# Patient Record
Sex: FEMALE | Employment: FULL TIME | URBAN - METROPOLITAN AREA
[De-identification: names, ages, dates, MRNs, and addresses within clinical notes are randomized per-mention and may not be internally consistent; named-entity substitution may affect disease eponyms.]

---

## 2017-06-30 ENCOUNTER — RADIANT APPOINTMENT (OUTPATIENT)
Dept: MAMMOGRAPHY | Facility: CLINIC | Age: 42
End: 2017-06-30
Payer: COMMERCIAL

## 2017-06-30 ENCOUNTER — OFFICE VISIT (OUTPATIENT)
Dept: OBGYN | Facility: CLINIC | Age: 42
End: 2017-06-30
Payer: COMMERCIAL

## 2017-06-30 VITALS
WEIGHT: 156 LBS | SYSTOLIC BLOOD PRESSURE: 104 MMHG | BODY MASS INDEX: 23.64 KG/M2 | HEIGHT: 68 IN | DIASTOLIC BLOOD PRESSURE: 60 MMHG

## 2017-06-30 DIAGNOSIS — D68.2 FACTOR V DEFICIENCY (H): ICD-10-CM

## 2017-06-30 DIAGNOSIS — Z12.31 VISIT FOR SCREENING MAMMOGRAM: ICD-10-CM

## 2017-06-30 DIAGNOSIS — R56.9 SEIZURE (H): ICD-10-CM

## 2017-06-30 DIAGNOSIS — Z01.419 ENCOUNTER FOR GYNECOLOGICAL EXAMINATION WITHOUT ABNORMAL FINDING: Primary | ICD-10-CM

## 2017-06-30 PROCEDURE — 99386 PREV VISIT NEW AGE 40-64: CPT | Performed by: OBSTETRICS & GYNECOLOGY

## 2017-06-30 PROCEDURE — 77063 BREAST TOMOSYNTHESIS BI: CPT | Mod: TC

## 2017-06-30 PROCEDURE — G0202 SCR MAMMO BI INCL CAD: HCPCS | Mod: TC

## 2017-06-30 RX ORDER — OMEGA-3/DHA/EPA/FISH OIL 60 MG-90MG
CAPSULE ORAL
COMMUNITY
End: 2019-12-23

## 2017-06-30 RX ORDER — LEVETIRACETAM 750 MG/1
750 TABLET ORAL
COMMUNITY
Start: 2017-03-15 | End: 2019-12-23

## 2017-06-30 RX ORDER — ERGOCALCIFEROL 1.25 MG/1
50000 CAPSULE ORAL
COMMUNITY
End: 2020-05-12

## 2017-06-30 ASSESSMENT — ANXIETY QUESTIONNAIRES
7. FEELING AFRAID AS IF SOMETHING AWFUL MIGHT HAPPEN: NOT AT ALL
IF YOU CHECKED OFF ANY PROBLEMS ON THIS QUESTIONNAIRE, HOW DIFFICULT HAVE THESE PROBLEMS MADE IT FOR YOU TO DO YOUR WORK, TAKE CARE OF THINGS AT HOME, OR GET ALONG WITH OTHER PEOPLE: NOT DIFFICULT AT ALL
5. BEING SO RESTLESS THAT IT IS HARD TO SIT STILL: NOT AT ALL
2. NOT BEING ABLE TO STOP OR CONTROL WORRYING: NOT AT ALL
3. WORRYING TOO MUCH ABOUT DIFFERENT THINGS: NOT AT ALL
GAD7 TOTAL SCORE: 2
1. FEELING NERVOUS, ANXIOUS, OR ON EDGE: SEVERAL DAYS
6. BECOMING EASILY ANNOYED OR IRRITABLE: NOT AT ALL

## 2017-06-30 ASSESSMENT — PATIENT HEALTH QUESTIONNAIRE - PHQ9: 5. POOR APPETITE OR OVEREATING: SEVERAL DAYS

## 2017-06-30 NOTE — MR AVS SNAPSHOT
"              After Visit Summary   2017    Lucia Moore    MRN: [id number]           Patient Information     Date Of Birth          1975        Visit Information        Provider Department      2017 8:00 AM Ebenezer Sterling MD [facility]        Today's Diagnoses     Encounter for gynecological examination without abnormal finding    -  1    Seizure (H)        Factor V deficiency (H)           Follow-ups after your visit        Who to contact     If you have questions or need follow up information about today's clinic visit or your schedule please contact [facility] directly at [phone].  Normal or non-critical lab and imaging results will be communicated to you by Faniumhart, letter or phone within 4 business days after the clinic has received the results. If you do not hear from us within 7 days, please contact the clinic through Faniumhart or phone. If you have a critical or abnormal lab result, we will notify you by phone as soon as possible.  Submit refill requests through Ascendant Dx or call your pharmacy and they will forward the refill request to us. Please allow 3 business days for your refill to be completed.          Additional Information About Your Visit        MyChart Information     Ascendant Dx lets you send messages to your doctor, view your test results, renew your prescriptions, schedule appointments and more. To sign up, go to [email or web address] . Click on \"Log in\" on the left side of the screen, which will take you to the Welcome page. Then click on \"Sign up Now\" on the right side of the page.     You will be asked to enter the access code listed below, as well as some personal information. Please follow the directions to create your username and password.     Your access code is: [email or web address]  Expires: 2017 12:16 PM     Your access code will  in 90 days. If you need help or a new code, please call your [place] clinic or " "[phone].        Care EveryWhere ID     This is your Care EveryWhere ID. This could be used by other organizations to access your [place] medical records  [id number]        Your Vitals Were     Height Last Period BMI (Body Mass Index)             5' 7.5\" (1.715 m) 06/02/2017 (Approximate) 24.07 kg/m2          Blood Pressure from Last 3 Encounters:   06/30/17 104/60    Weight from Last 3 Encounters:   06/30/17 156 lb (70.8 kg)              Today, you had the following     No orders found for display       Primary Care Provider Office Phone # Fax #    Aristides Mendez [phone]       [facility] [address] [place] [address]        Equal Access to Services     DELFINO DYSON : Hadii lyle ku hadasho Soomaali, waaxda luqadaha, qaybta kaalmada adeegyada, waxay idiin hayaan delfino goyal . So [facility] [phone].    ATENCIÓN: Si habla español, tiene a phillips disposición servicios gratuitos de asistencia lingüística. Llame al [phone].    We comply with applicable federal civil rights laws and [place] laws. We do not discriminate on the basis of race, color, national origin, age, disability sex, sexual orientation or gender identity.            Thank you!     Thank you for choosing [facility]  for your care. Our goal is always to provide you with excellent care. Hearing back from our patients is one way we can continue to improve our services. Please take a few minutes to complete the written survey that you may receive in the mail after your visit with us. Thank you!             Your Updated Medication List - Protect others around you: Learn how to safely use, store and throw away your medicines at www.disposemymeds.org.          This list is accurate as of: 6/30/17 12:16 PM.  Always use your most recent med list.                   Brand Name Dispense Instructions for use Diagnosis    ergocalciferol 47215 UNITS capsule    ERGOCALCIFEROL     Take " 50,000 Units by mouth        Fish Oil 500 MG Caps           levETIRAcetam 750 MG tablet    KEPPRA     Take 750 mg by mouth

## 2017-06-30 NOTE — PROGRESS NOTES
Lucia is a 41 year old No obstetric history on file. female who presents for annual exam.     Besides routine health maintenance, she has no other health concerns today .    HPI:  PCP is Dr. Logan Mendez from [facility].  Had headaches last year. PCP ordered CT. 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 Had emergency craniotomy and resection. Did have seizure and has been kept on meds.    just had VAS. Pt off hormonal OCP.  Cycles normal.   Also has Factor V mutation. No hx of clots.     Works at [employer]. President of [employer].      GYNECOLOGIC HISTORY:    Patient's last menstrual period was 06/02/2017 (approximate).  Her current contraception method is: vasectomy.  She  reports that she has never smoked. She does not have any smokeless tobacco history on file.    Patient is sexually active.  STD testing offered?  Declined  Last PHQ-9 score on record =   PHQ-9 SCORE 6/30/2017   Total Score 1     Last GAD7 score on record =   RITA-7 SCORE 6/30/2017   Total Score 2     Alcohol Score = 4    HEALTH MAINTENANCE:  Cholesterol: None found  Last Mammo: 18 months ago, Result: normal, Next Mammo: today   Pap: 02/04/16 wnl, HPV- done at [facility]; on care everywhere  Colonoscopy:  Never, Result: not applicable, Next Colonoscopy: 8 years.  Dexa:  Never    Health maintenance updated:  yes    HISTORY:  Obstetric History     No data available          Patient Active Problem List   Diagnosis     Seizure (H)     Factor V deficiency (H)     Past Surgical History:   Procedure Laterality Date     BRAIN SURGERY  05/18/2016      Social History   Substance Use Topics     Smoking status: Never Smoker     Smokeless tobacco: Not on file     Alcohol use Yes      Problem (# of Occurrences) Relation (Name,Age of Onset)    Hyperlipidemia (1) Father            Current Outpatient Prescriptions   Medication Sig     levETIRAcetam (KEPPRA) 750 MG tablet Take 750 mg by mouth     ergocalciferol (ERGOCALCIFEROL) 92620 UNITS capsule  "Take 50,000 Units by mouth     Omega-3 Fatty Acids (FISH OIL) 500 MG CAPS      No current facility-administered medications for this visit.      Allergies   Allergen Reactions     Amoxicillin-Pot Clavulanate      PN: bad yeast infection       Past medical, surgical, social and family histories were reviewed and updated in EPIC.    ROS:   12 point review of systems negative other than symptoms noted below.  Gastrointestinal: Heartburn  Neurologic: Headaches    EXAM:  /60  Ht 5' 7.5\" (1.715 m)  Wt 156 lb (70.8 kg)  LMP 06/02/2017 (Approximate)  BMI 24.07 kg/m2   BMI: Body mass index is 24.07 kg/(m^2).    PHYSICAL EXAM:  Constitutional:  Appearance: Well nourished, well developed, alert, in no acute distress  Neck:  Lymph Nodes:  No lymphadenopathy present    Thyroid:  Gland size normal, nontender, no nodules or masses present  on palpation  Chest:  Respiratory Effort:  Breathing unlabored  Cardiovascular:    Heart: Auscultation:  Regular rate, normal rhythm, no murmurs present  Breasts: Inspection of Breasts:  No lymphadenopathy present    Palpation of Breasts and Axillae:  No masses present on palpation, no  breast tenderness    Axillary Lymph Nodes:  No lymphadenopathy present  Gastrointestinal:   Abdominal Examination:  Abdomen nontender to palpation, tone normal without rigidity or guarding, no masses present, umbilicus without lesions   Liver and Spleen:  No hepatomegaly present, liver nontender to palpation    Hernias:  No hernias present  Lymphatic: Lymph Nodes:  No other lymphadenopathy present  Skin:  General Inspection:  No rashes present, no lesions present, no areas of  discoloration    Genitalia and Groin:  No rashes present, no lesions present, no areas of  discoloration, no masses present  Neurologic/Psychiatric:    Mental Status:  Oriented X3     Pelvic Exam:  External Genitalia:     Normal appearance for age, no discharge present, no tenderness present, no inflammatory lesions present, color " normal  Vagina:     Normal vaginal vault without central or paravaginal defects, no discharge present, no inflammatory lesions present, no masses present  Bladder:     Nontender to palpation  Urethra:   Urethral Body:  Urethra palpation normal, urethra structural support normal   Urethral Meatus:  No erythema or lesions present  Cervix:     Appearance healthy, no lesions present, nontender to palpation, no bleeding present  Uterus:     Uterus: firm, normal sized and nontender, anteverted in position.   Adnexa:     No adnexal tenderness present, no adnexal masses present  Perineum:     Perineum within normal limits, no evidence of trauma, no rashes or skin lesions present  Anus:     Anus within normal limits, no hemorrhoids present  Inguinal Lymph Nodes:     No lymphadenopathy present  Pubic Hair:     Normal pubic hair distribution for age  Genitalia and Groin:     No rashes present, no lesions present, no areas of discoloration, no masses present    COUNSELING:   Reviewed preventive health counseling, as reflected in patient instructions       Regular exercise    BMI: Body mass index is 24.07 kg/(m^2).      ASSESSMENT:  41 year old female with satisfactory annual exam.    ICD-10-CM    1. Encounter for gynecological examination without abnormal finding Z01.419    2. Seizure (H) R56.9    3. Factor V deficiency (H) D68.2        PLAN:  Pt has regular cycles. No GYN issues. RTC 1-2 years for exam. Last pap and HPV normal in 2016, so no pap in 4 years.      Ebenezer Sterling MD

## 2017-07-01 ASSESSMENT — ANXIETY QUESTIONNAIRES: GAD7 TOTAL SCORE: 2

## 2017-07-01 ASSESSMENT — PATIENT HEALTH QUESTIONNAIRE - PHQ9: SUM OF ALL RESPONSES TO PHQ QUESTIONS 1-9: 1

## 2017-09-15 ENCOUNTER — TELEPHONE (OUTPATIENT)
Dept: NURSING | Facility: CLINIC | Age: 42
End: 2017-09-15

## 2017-09-15 ENCOUNTER — OFFICE VISIT (OUTPATIENT)
Dept: MIDWIFE SERVICES | Facility: CLINIC | Age: 42
End: 2017-09-15
Payer: COMMERCIAL

## 2017-09-15 VITALS — BODY MASS INDEX: 24.38 KG/M2 | DIASTOLIC BLOOD PRESSURE: 74 MMHG | WEIGHT: 158 LBS | SYSTOLIC BLOOD PRESSURE: 106 MMHG

## 2017-09-15 DIAGNOSIS — N89.8 ITCHING OF VAGINA: ICD-10-CM

## 2017-09-15 DIAGNOSIS — R35.0 URINARY FREQUENCY: ICD-10-CM

## 2017-09-15 DIAGNOSIS — R30.0 DYSURIA: Primary | ICD-10-CM

## 2017-09-15 LAB
ALBUMIN UR-MCNC: NEGATIVE MG/DL
APPEARANCE UR: CLEAR
BILIRUB UR QL STRIP: NEGATIVE
COLOR UR AUTO: YELLOW
GLUCOSE UR STRIP-MCNC: NEGATIVE MG/DL
HGB UR QL STRIP: ABNORMAL
KETONES UR STRIP-MCNC: NEGATIVE MG/DL
LEUKOCYTE ESTERASE UR QL STRIP: NEGATIVE
NITRATE UR QL: NEGATIVE
NON-SQ EPI CELLS #/AREA URNS LPF: ABNORMAL /LPF
PH UR STRIP: 6 PH (ref 5–7)
RBC #/AREA URNS AUTO: ABNORMAL /HPF
SOURCE: ABNORMAL
SP GR UR STRIP: <=1.005 (ref 1–1.03)
SPECIMEN SOURCE: ABNORMAL
UROBILINOGEN UR STRIP-ACNC: 0.2 EU/DL (ref 0.2–1)
WBC #/AREA URNS AUTO: ABNORMAL /HPF
WET PREP SPEC: ABNORMAL

## 2017-09-15 PROCEDURE — 99213 OFFICE O/P EST LOW 20 MIN: CPT | Performed by: ADVANCED PRACTICE MIDWIFE

## 2017-09-15 PROCEDURE — 87210 SMEAR WET MOUNT SALINE/INK: CPT | Performed by: ADVANCED PRACTICE MIDWIFE

## 2017-09-15 PROCEDURE — 87086 URINE CULTURE/COLONY COUNT: CPT | Performed by: ADVANCED PRACTICE MIDWIFE

## 2017-09-15 PROCEDURE — 81001 URINALYSIS AUTO W/SCOPE: CPT | Performed by: ADVANCED PRACTICE MIDWIFE

## 2017-09-15 RX ORDER — FLUCONAZOLE 150 MG/1
150 TABLET ORAL ONCE
Qty: 2 TABLET | Refills: 0 | Status: SHIPPED | OUTPATIENT
Start: 2017-09-15 | End: 2017-09-15

## 2017-09-15 RX ORDER — NITROFURANTOIN 25; 75 MG/1; MG/1
100 CAPSULE ORAL 2 TIMES DAILY
Qty: 14 CAPSULE | Refills: 0 | Status: SHIPPED | OUTPATIENT
Start: 2017-09-15 | End: 2019-12-23

## 2017-09-15 NOTE — TELEPHONE ENCOUNTER
Pt states she is pretty sure she has a bladder infection and potential yeast infection. Pt states she has not had on for a while. Pt states she is having urgency, polyuria, states not much urine comes out when she does go to bathroom. Pt denies dysuria, pain, blood in urine.   Per protocol for yeast symptoms:    If sx are primarily itching, no significant discharge: yes    Try Monistat (or generis equivalent) 3 or 7 days OTC (not 1 day). If not better in 3 days, needs to be seen.  Informed pt for her bladder concerns needs to be seen. Pt scheduled with CNM today at earliest time she is able to get her from work. Pt stated understanding and had no further questions.

## 2017-09-15 NOTE — MR AVS SNAPSHOT
"              After Visit Summary   9/15/2017    Lucia Moore    MRN: [id number]           Patient Information     Date Of Birth          1975        Visit Information        Provider Department      9/15/2017 3:40 PM Melina Pan APRN CNM [facility]        Today's Diagnoses     Dysuria    -  1    Itching of vagina        Urinary frequency           Follow-ups after your visit        Follow-up notes from your care team     Return if symptoms worsen or fail to improve.      Who to contact     If you have questions or need follow up information about today's clinic visit or your schedule please contact [facility] directly at [phone].  Normal or non-critical lab and imaging results will be communicated to you by Fetchnoteshart, letter or phone within 4 business days after the clinic has received the results. If you do not hear from us within 7 days, please contact the clinic through Fetchnoteshart or phone. If you have a critical or abnormal lab result, we will notify you by phone as soon as possible.  Submit refill requests through Newdea or call your pharmacy and they will forward the refill request to us. Please allow 3 business days for your refill to be completed.          Additional Information About Your Visit        MyChart Information     Newdea lets you send messages to your doctor, view your test results, renew your prescriptions, schedule appointments and more. To sign up, go to [email or web address] . Click on \"Log in\" on the left side of the screen, which will take you to the Welcome page. Then click on \"Sign up Now\" on the right side of the page.     You will be asked to enter the access code listed below, as well as some personal information. Please follow the directions to create your username and password.     Your access code is: [email or web address]  Expires: 2017 12:16 PM     Your access code will  in 90 days. If you need help or a new " code, please call your [place] clinic or [phone].        Care EveryWhere ID     This is your Care EveryWhere ID. This could be used by other organizations to access your [place] medical records  [id number]        Your Vitals Were     Last Period Breastfeeding? BMI (Body Mass Index)             08/27/2017 (Exact Date) No 24.38 kg/m2          Blood Pressure from Last 3 Encounters:   09/15/17 106/74   06/30/17 104/60    Weight from Last 3 Encounters:   09/15/17 158 lb (71.7 kg)   06/30/17 156 lb (70.8 kg)              We Performed the Following     UA with Microscopic     Urine Culture Aerobic Bacterial     Wet prep          Today's Medication Changes          These changes are accurate as of: 9/15/17  4:18 PM.  If you have any questions, ask your nurse or doctor.               Start taking these medicines.        Dose/Directions    fluconazole 150 MG tablet   Commonly known as:  DIFLUCAN   Used for:  Itching of vagina   Started by:  Melina Pan APRN CNM        Dose:  150 mg   Take 1 tablet (150 mg) by mouth once for 1 dose Take one tablet now, repeat dose after antibiotics are finished   Quantity:  2 tablet   Refills:  0       nitroFURantoin (macrocrystal-monohydrate) 100 MG capsule   Commonly known as:  MACROBID   Used for:  Urinary frequency   Started by:  Melina Pan APRN CNM        Dose:  100 mg   Take 1 capsule (100 mg) by mouth 2 times daily   Quantity:  14 capsule   Refills:  0            Where to get your medicines      [facility] [address] [place] [address] [id number] [place] [address]     Phone:  [phone]     fluconazole 150 MG tablet    nitroFURantoin (macrocrystal-monohydrate) 100 MG capsule                Primary Care Provider Office Phone # Fax #    Aristides Mendez [phone]       [facility] [address] [place] [address]         Equal Access to Services     [facility]: Hadii aad ku hadshaguftareji Annieali, wareda luqadaha, qaybta ardenmattgeetha willis. So [facility] [phone].    ATENCIÓN: Si habla español, tiene a phillips disposición servicios gratuitos de asistencia lingüística. Llame al [phone].    We comply with applicable federal civil rights laws and [place] laws. We do not discriminate on the basis of race, color, national origin, age, disability sex, sexual orientation or gender identity.            Thank you!     Thank you for choosing [facility]  for your care. Our goal is always to provide you with excellent care. Hearing back from our patients is one way we can continue to improve our services. Please take a few minutes to complete the written survey that you may receive in the mail after your visit with us. Thank you!             Your Updated Medication List - Protect others around you: Learn how to safely use, store and throw away your medicines at www.disposemymeds.org.          This list is accurate as of: 9/15/17  4:18 PM.  Always use your most recent med list.                   Brand Name Dispense Instructions for use Diagnosis    ergocalciferol 44126 UNITS capsule    ERGOCALCIFEROL     Take 50,000 Units by mouth        Fish Oil 500 MG Caps           fluconazole 150 MG tablet    DIFLUCAN    2 tablet    Take 1 tablet (150 mg) by mouth once for 1 dose Take one tablet now, repeat dose after antibiotics are finished    Itching of vagina       levETIRAcetam 750 MG tablet    KEPPRA     Take 750 mg by mouth        nitroFURantoin (macrocrystal-monohydrate) 100 MG capsule    MACROBID    14 capsule    Take 1 capsule (100 mg) by mouth 2 times daily    Urinary frequency

## 2017-09-15 NOTE — PROGRESS NOTES
Results discussed directly with patient while patient was present. Any further details documented in the note.   JULIO C Jackson CNM

## 2017-09-15 NOTE — PROGRESS NOTES
SUBJECTIVE: Lucia Moore is a 42 year old female who presents today with UTI symptoms: frequency, urgency. Feels like this is similar to all other UTIs she has had.     URINARY TRACT SYMPTOMS     Onset: x1 day    Description:   Painful urination (Dysuria): Yes  Blood in urine (Hematuria): No  Urgency/Frequency: Yes    Progression of Symptoms:  worsening    Accompanying Signs & Symptoms:  Fever/chills: No  Flank pain: No  Nausea and vomiting: No  Any vaginal symptoms: Yes  Abdominal/Pelvic Pain: Yes   History:   History of frequent UTI's: Yes  History of kidney stones: No  Sexually Active: Yes  Possibility of pregnancy: No  Contraceptive type:  vasectomy    Precipitating factors:          Therapies Tried and outcome: none      Patient Active Problem List   Diagnosis     Seizure (H)     Factor V deficiency (H)     Past Medical History:   Diagnosis Date     Benign brain tumor (H) 2016    Cavernous Angioma. Had brain bleed and seizure     Factor V deficiency (H)      Seizure (H)     as a result of brain lesion     Past Surgical History:   Procedure Laterality Date     BRAIN SURGERY  05/18/2016     Current Outpatient Prescriptions   Medication Sig Dispense Refill     nitroFURantoin, macrocrystal-monohydrate, (MACROBID) 100 MG capsule Take 1 capsule (100 mg) by mouth 2 times daily 14 capsule 0     fluconazole (DIFLUCAN) 150 MG tablet Take 1 tablet (150 mg) by mouth once for 1 dose Take one tablet now, repeat dose after antibiotics are finished 2 tablet 0     levETIRAcetam (KEPPRA) 750 MG tablet Take 750 mg by mouth       ergocalciferol (ERGOCALCIFEROL) 25548 UNITS capsule Take 50,000 Units by mouth       Omega-3 Fatty Acids (FISH OIL) 500 MG CAPS        Allergies   Allergen Reactions     Amoxicillin-Pot Clavulanate      PN: bad yeast infection       Health maintenance updated:  yes    ROS:  12 point review of systems negative other than symptoms noted below.  Constitutional: Fatigue  Genitourinary: Frequency, Painful  [place], Urgency, Vaginal Discharge, Vaginal Dryness and Vaginal Itching    PHYSICAL EXAM:  Vitals: /74  Wt 158 lb (71.7 kg)  LMP 08/27/2017 (Exact Date)  Breastfeeding? No  BMI 24.38 kg/m2  BMI= Body mass index is 24.38 kg/(m^2).  Patient appears well, afebrile.   ABD: Soft without supra pubic pain or tenderness  BACK: Neg CVAT.     PELVIC EXAM:  Vulva: No external lesions, BUS WNL  Vagina: Moist, pink, discharge thick,  well rugated, no lesions  Cervix: , smooth, pink, no visible lesions, neg CMT  Uterus: Normal size, anteverted, non-tender, mobile  Ovaries: No mass, non-tender  Rectal exam: deferred      ASSESSMENT/PLAN:      ICD-10-CM    1. Dysuria R30.0 UA with Microscopic   2. Itching of vagina L29.8 Wet prep     fluconazole (DIFLUCAN) 150 MG tablet   3. Urinary frequency R35.0 Urine Culture Aerobic Bacterial     nitroFURantoin, macrocrystal-monohydrate, (MACROBID) 100 MG capsule     Results for orders placed or performed in visit on 09/15/17   UA with Microscopic   Result Value Ref Range    Color Urine Yellow     Appearance Urine Clear     Glucose Urine Negative NEG^Negative mg/dL    Bilirubin Urine Negative NEG^Negative    Ketones Urine Negative NEG^Negative mg/dL    Specific Gravity Urine <=1.005 1.003 - 1.035    pH Urine 6.0 5.0 - 7.0 pH    Protein Albumin Urine Negative NEG^Negative mg/dL    Urobilinogen Urine 0.2 0.2 - 1.0 EU/dL    Nitrite Urine Negative NEG^Negative    Blood Urine Trace (A) NEG^Negative    Leukocyte Esterase Urine Negative NEG^Negative    Source Midstream Urine     WBC Urine O - 2 OTO2^O - 2 /HPF    RBC Urine O - 2 OTO2^O - 2 /HPF    Squamous Epithelial /LPF Urine Few FEW^Few /LPF   Wet prep   Result Value Ref Range    Specimen Description Vagina     Wet Prep No Trichomonas seen     Wet Prep No clue cells seen     Wet Prep Yeast seen (A)        Urine was sent for culture.     COUNSELING:  Push fluids    May use Uristat or other OTC med for dysuria  Reinforced the  importance of completing this course of antibiotics   Handout provided regarding UTI prevention  RTC with continued symptoms or concerns.    15 minutes was spent face to face with the patient today discussing her history, diagnosis, and follow-up plan as noted above. Over 50% of the visit was spent in counseling and coordination of care.    Total Visit Time: 15 minutes.         JULIO C Valdivia, SERGEYM

## 2017-09-16 LAB
BACTERIA SPEC CULT: NORMAL
Lab: NORMAL
SPECIMEN SOURCE: NORMAL

## 2019-12-23 ENCOUNTER — OFFICE VISIT (OUTPATIENT)
Dept: OBGYN | Facility: CLINIC | Age: 44
End: 2019-12-23
Payer: COMMERCIAL

## 2019-12-23 ENCOUNTER — TELEPHONE (OUTPATIENT)
Dept: OBGYN | Facility: CLINIC | Age: 44
End: 2019-12-23

## 2019-12-23 VITALS
BODY MASS INDEX: 23.73 KG/M2 | HEIGHT: 69 IN | WEIGHT: 160.2 LBS | DIASTOLIC BLOOD PRESSURE: 72 MMHG | SYSTOLIC BLOOD PRESSURE: 100 MMHG

## 2019-12-23 DIAGNOSIS — N89.8 ITCHING OF VAGINA: Primary | ICD-10-CM

## 2019-12-23 LAB
GRAM STN SPEC: ABNORMAL
Lab: ABNORMAL
SPECIMEN SOURCE: ABNORMAL

## 2019-12-23 PROCEDURE — 87205 SMEAR GRAM STAIN: CPT | Performed by: OBSTETRICS & GYNECOLOGY

## 2019-12-23 PROCEDURE — 87653 STREP B DNA AMP PROBE: CPT | Performed by: OBSTETRICS & GYNECOLOGY

## 2019-12-23 PROCEDURE — 87106 FUNGI IDENTIFICATION YEAST: CPT | Performed by: OBSTETRICS & GYNECOLOGY

## 2019-12-23 PROCEDURE — 99213 OFFICE O/P EST LOW 20 MIN: CPT | Performed by: OBSTETRICS & GYNECOLOGY

## 2019-12-23 PROCEDURE — 87102 FUNGUS ISOLATION CULTURE: CPT | Performed by: OBSTETRICS & GYNECOLOGY

## 2019-12-23 RX ORDER — FLUCONAZOLE 150 MG/1
150 TABLET ORAL DAILY
Qty: 3 TABLET | Refills: 1 | Status: SHIPPED | OUTPATIENT
Start: 2019-12-23 | End: 2020-05-12

## 2019-12-23 ASSESSMENT — MIFFLIN-ST. JEOR: SCORE: 1436.04

## 2019-12-23 NOTE — PROGRESS NOTES
SUBJECTIVE:                                                   Lucia Moore is a 44 year old female who presents to clinic today for the following health issue(s):  Patient presents with:  Vaginal Problem: Patient states that she believes that she has an yeast infection.      HPI: The patient is a 44-year-old who took a course of antibiotics recently for an upper respiratory infection.  She has had thick white discharge and itching over the last few days.  She has no urinary symptoms.      Patient's last menstrual period was 11/28/2019 (approximate)..     Patient is sexually active, No obstetric history on file..  Using none for contraception.    reports that she has never smoked. She has never used smokeless tobacco.    STD testing offered?  Declined    Health maintenance updated:  yes    Today's PHQ-2 Score: No flowsheet data found.  Today's PHQ-9 Score:   PHQ-9 SCORE 6/30/2017   PHQ-9 Total Score 1     Today's RITA-7 Score:   RITA-7 SCORE 6/30/2017   Total Score 2       Problem list and histories reviewed & adjusted, as indicated.  Additional history: as documented.    Patient Active Problem List   Diagnosis     Seizure (H)     Factor V deficiency (H)     Past Surgical History:   Procedure Laterality Date     BRAIN SURGERY  05/18/2016      Social History     Tobacco Use     Smoking status: Never Smoker     Smokeless tobacco: Never Used   Substance Use Topics     Alcohol use: Yes      Problem (# of Occurrences) Relation (Name,Age of Onset)    Hyperlipidemia (1) Father            Current Outpatient Medications   Medication Sig     ergocalciferol (ERGOCALCIFEROL) 59980 UNITS capsule Take 50,000 Units by mouth     No current facility-administered medications for this visit.      Allergies   Allergen Reactions     Amoxicillin-Pot Clavulanate      PN: bad yeast infection       ROS:  12 point review of systems negative other than symptoms noted below or in the HPI.  Genitourinary: Vaginal Discharge and Vaginal  "Itching  No urinary frequency or dysuria, bladder or kidney problems      OBJECTIVE:     /72   Ht 1.753 m (5' 9\")   Wt 72.7 kg (160 lb 3.2 oz)   LMP 11/28/2019 (Approximate)   Breastfeeding No   BMI 23.66 kg/m    Body mass index is 23.66 kg/m .    Exam:  Constitutional:  Appearance: Well nourished, well developed alert, in no acute distress  Lymphatic: Lymph Nodes:  No other lymphadenopathy present  Skin: General Inspection:  No rashes present, no lesions present, no areas of discoloration.  Neurologic:  Mental Status:  Oriented X3.  Normal strength and tone, sensory exam grossly normal, mentation intact and speech normal.    Psychiatric:  Mentation appears normal and affect normal/bright.  Pelvic Exam:  External Genitalia:     Normal appearance for age, no discharge present, no tenderness present, no inflammatory lesions present, color normal  Vagina: Obvious yeast infection,    Bladder:     Nontender to palpation  Urethra:   Urethral Body:  Urethra palpation normal, urethra structural support normal   Urethral Meatus:  No erythema or lesions present  Cervix:     Appearance healthy, no lesions present, nontender to palpation, no bleeding present  Uterus:     Uterus: firm, normal sized and nontender, midplane in position.   Adnexa:     No adnexal tenderness present, no adnexal masses present  Perineum:     Perineum within normal limits, no evidence of trauma, no rashes or skin lesions present  Anus:     Anus within normal limits, no hemorrhoids present  Inguinal Lymph Nodes:     No lymphadenopathy present  Pubic Hair:     Normal pubic hair distribution for age  Genitalia and Groin:     No rashes present, no lesions present, no areas of discoloration, no masses present       In-Clinic Test Results:      ASSESSMENT/PLAN:                                                        ICD-10-CM    1. Itching of vagina N89.8      44-year-old patient with probable yeast infection.  We have asked her to sitz bathe a " couple times a day and use the Diflucan for the next 3 nights.  This should clear things.  We will follow-up her culture.        Charles De Oliveira MD  [facility] [place]

## 2019-12-23 NOTE — TELEPHONE ENCOUNTER
Sx's of yeast infection: primarily vaginal discharge - creamy, white with a little odor. Today started burning.  Recommended eval in office as symptoms could be bacterial vs yeast.  Willing to schedule - transferred to scheduling.  Raquel Sanz RN on 12/23/2019 at 9:41 AM

## 2019-12-24 LAB
GP B STREP DNA SPEC QL NAA+PROBE: NEGATIVE
SPECIMEN SOURCE: NORMAL

## 2019-12-25 LAB
Lab: ABNORMAL
SPECIMEN SOURCE: ABNORMAL
YEAST SPEC QL CULT: ABNORMAL

## 2020-01-30 ENCOUNTER — TRANSFERRED RECORDS (OUTPATIENT)
Dept: MULTI SPECIALTY CLINIC | Facility: CLINIC | Age: 45
End: 2020-01-30

## 2020-01-30 LAB
HPV ABSTRACT: ABNORMAL
PAP-ABSTRACT: NORMAL

## 2020-03-10 ENCOUNTER — HEALTH MAINTENANCE LETTER (OUTPATIENT)
Age: 45
End: 2020-03-10

## 2020-05-08 NOTE — PROGRESS NOTES
SUBJECTIVE:                                                   Lucia Moore is a 44 year old female who presents to clinic today for the following health issue(s):  Patient presents with:  Breast Problem: Both breast were in pain and was swollen for past 2 weeks. Once patient got her period the symptoms lessened.       Additional information: Last mammogram was 6/30/2017    HPI:  Developed significant bilateral breast pain for 2 weeks. No mass palpable. Had menses and mastalgia disappeared.  Menses on time.  Had executive physical in Jan at [place]. Had mammogram and ultrasound. Repeat due in 3 months from now. Fibroadenoma Dx on right and multiple breast cysts bilaterally.   Pt had exaggerated premenstrual symptoms this cycle as well.  Also had Normal pap at [place] and pos for HPV high risk other.   Has questions.      Patient's last menstrual period was 05/09/2020..     Patient is sexually active, No obstetric history on file..  Using vasectomy for contraception.    reports that she has never smoked. She has never used smokeless tobacco.    STD testing offered?  Declined    Health maintenance updated:  no, Due for mammogram.     Today's PHQ-2 Score: No flowsheet data found.  Today's PHQ-9 Score:   PHQ-9 SCORE 6/30/2017   PHQ-9 Total Score 1     Today's RITA-7 Score:   RITA-7 SCORE 6/30/2017   Total Score 2       Problem list and histories reviewed & adjusted, as indicated.  Additional history: as documented.    Patient Active Problem List   Diagnosis     Seizure (H)     Factor V deficiency (H)     Past Surgical History:   Procedure Laterality Date     BRAIN SURGERY  05/18/2016      Social History     Tobacco Use     Smoking status: Never Smoker     Smokeless tobacco: Never Used   Substance Use Topics     Alcohol use: Yes      Problem (# of Occurrences) Relation (Name,Age of Onset)    Hyperlipidemia (1) Father            Current Outpatient Medications   Medication Sig     fluticasone (FLOVENT HFA) 110 MCG/ACT  "inhaler as needed.     SUMAtriptan (IMITREX) 20 MG/ACT nasal spray U 1 SPRAY AT ONSET OF MIGRAINE. MAY REPEAT IN 2 H. MAX 2 PER DAY. MAX 9 DAYS PER MONTH.     VENTOLIN  (90 Base) MCG/ACT inhaler INL 1 TO 2 PFS PO Q 4 H PRN     No current facility-administered medications for this visit.      Allergies   Allergen Reactions     Amoxicillin-Pot Clavulanate      PN: bad yeast infection     Augmentin      Other reaction(s): Other (see comments)  PN: bad yeast infection  PN: bad yeast infection       ROS:  12 point review of systems negative other than symptoms noted below or in the HPI.  Breast: breast pain and swelling        OBJECTIVE:     /64 (BP Location: Right arm, Patient Position: Sitting, Cuff Size: Adult Regular)   Pulse 60   Ht 1.715 m (5' 7.5\")   Wt 71.2 kg (157 lb)   LMP 05/09/2020   Breastfeeding No   BMI 24.23 kg/m    Body mass index is 24.23 kg/m .    Exam:  Constitutional:  Appearance: Well nourished, well developed alert, in no acute distress  Breasts:  Inspection of Breasts:  Symmetric bilaterally.  No puckering.  No skin changes.  Palpation of Breasts and Axillae:  No masses present on palpation, no breast tenderness Axillary Lymph Nodes:  No lymphadenopathy present  Neurologic:  Mental Status:  Oriented X3.  Normal strength and tone, sensory exam grossly normal, mentation intact and speech normal.    Psychiatric:  Mentation appears normal and affect normal/bright.     In-Clinic Test Results:  No results found for this or any previous visit (from the past 24 hour(s)).    ASSESSMENT/PLAN:                                                        ICD-10-CM    1. Mastalgia  N64.4    2. Cervical high risk HPV (human papillomavirus) test positive  R87.810        Discussed resolved mastalgia. Explained different ovulation and symptoms. Discussed changes in her 40's. Reviewed difference between cycle changes and menopause. Will observe for now. Discussed diuretics if recurrent or " OCPs.  Explained HPV results and what they mean. Pt will have pap and HPV in a year. Discussed natural progression or regression.     25 minutes was spent face to face with the patient today discussing her history, diagnosis, and follow-up plan as noted above. Over 50% of the visit was spent in counseling and coordination of care.    Total Visit Time: 25 minutes.       Ebenezer Sterling MD  [facility]

## 2020-05-12 ENCOUNTER — OFFICE VISIT (OUTPATIENT)
Dept: OBGYN | Facility: CLINIC | Age: 45
End: 2020-05-12
Payer: COMMERCIAL

## 2020-05-12 VITALS
HEIGHT: 68 IN | DIASTOLIC BLOOD PRESSURE: 64 MMHG | WEIGHT: 157 LBS | BODY MASS INDEX: 23.79 KG/M2 | SYSTOLIC BLOOD PRESSURE: 106 MMHG | HEART RATE: 60 BPM

## 2020-05-12 DIAGNOSIS — N64.4 MASTALGIA: Primary | ICD-10-CM

## 2020-05-12 DIAGNOSIS — R87.810 CERVICAL HIGH RISK HPV (HUMAN PAPILLOMAVIRUS) TEST POSITIVE: ICD-10-CM

## 2020-05-12 PROCEDURE — 99214 OFFICE O/P EST MOD 30 MIN: CPT | Performed by: OBSTETRICS & GYNECOLOGY

## 2020-05-12 RX ORDER — SUMATRIPTAN 20 MG/1
SPRAY NASAL
COMMUNITY
Start: 2020-01-28

## 2020-05-12 RX ORDER — ALBUTEROL SULFATE 90 UG/1
AEROSOL, METERED RESPIRATORY (INHALATION)
COMMUNITY
Start: 2020-01-27

## 2020-05-12 RX ORDER — DEXAMETHASONE 4 MG/1
TABLET ORAL
COMMUNITY
Start: 2020-01-16

## 2020-05-12 ASSESSMENT — MIFFLIN-ST. JEOR: SCORE: 1397.71

## 2020-05-12 NOTE — NURSING NOTE
Please abstract the following data from this visit with this patient into the appropriate field in Epic:    Tests that can be patient reported without a hard copy:        Other Tests found in the patient's chart through Chart Review/Care Everywhere:    Pap smear done by this group [facility] this date: 1/30/2020    Note to Abstraction: If this section is blank, no results were found via Chart Review/Care Everywhere.

## 2020-05-12 NOTE — Clinical Note
Please abstract the following data from this visit with this patient into the appropriate field in Epic:    Tests that can be patient reported without a hard copy:    {Quality Abstract List (Optional):063250}    Other Tests found in the patient's chart through Chart Review/Care Everywhere:    Pap smear done by this group [facility] this date: 1/30/2020    Note to Abstraction: If this section is blank, no results were found via Chart Review/Care Everywhere.

## 2020-09-28 ENCOUNTER — OFFICE VISIT (OUTPATIENT)
Dept: OBGYN | Facility: CLINIC | Age: 45
End: 2020-09-28
Payer: COMMERCIAL

## 2020-09-28 VITALS
TEMPERATURE: 98.1 F | WEIGHT: 158.8 LBS | HEIGHT: 68 IN | BODY MASS INDEX: 24.07 KG/M2 | SYSTOLIC BLOOD PRESSURE: 94 MMHG | DIASTOLIC BLOOD PRESSURE: 52 MMHG

## 2020-09-28 DIAGNOSIS — N61.0 BREAST INFECTION: ICD-10-CM

## 2020-09-28 DIAGNOSIS — N63.0 BREAST LUMP: ICD-10-CM

## 2020-09-28 DIAGNOSIS — B37.9 YEAST INFECTION: Primary | ICD-10-CM

## 2020-09-28 PROCEDURE — 99213 OFFICE O/P EST LOW 20 MIN: CPT | Performed by: NURSE PRACTITIONER

## 2020-09-28 RX ORDER — FLUCONAZOLE 150 MG/1
150 TABLET ORAL
Qty: 3 TABLET | Refills: 1 | Status: SHIPPED | OUTPATIENT
Start: 2020-09-28

## 2020-09-28 RX ORDER — DICLOXACILLIN SODIUM 500 MG
500 CAPSULE ORAL 4 TIMES DAILY
Qty: 28 CAPSULE | Refills: 0 | Status: SHIPPED | OUTPATIENT
Start: 2020-09-28 | End: 2020-10-05

## 2020-09-28 ASSESSMENT — MIFFLIN-ST. JEOR: SCORE: 1400.87

## 2020-09-28 NOTE — PROGRESS NOTES
SUBJECTIVE:                                                   Lucia Moore is a 45 year old female who presents to clinic today for the following health issue(s):  Patient presents with:  Breast Problem: Patient has known fibroids. Pain was waking her up while sleeping. Has just finished her period. Pain is coming from the top and bottom of her nipple, and sudden onset. She has done warm compresses and ibuprofen with some relief.      Additional information: Has a lower back rash as well    HPI: Patient c/o left breast pain that came  Up suddenly yesterday.  She states is very tender, feels a large hard area in breast, used a heating pad last night and did help.  Took advil this morning.  Had a normal mammogram at [place] this last July.      Patient's last menstrual period was 09/21/2020 (approximate)..     Patient is sexually active, No obstetric history on file..  Using vasectomy for contraception.    reports that she has never smoked. She has never used smokeless tobacco.    STD testing offered?  Declined    Health maintenance updated:  yes    Today's PHQ-2 Score: No flowsheet data found.  Today's PHQ-9 Score:   PHQ-9 SCORE 6/30/2017   PHQ-9 Total Score 1     Today's RITA-7 Score:   RITA-7 SCORE 6/30/2017   Total Score 2       Problem list and histories reviewed & adjusted, as indicated.  Additional history: as documented.    Patient Active Problem List   Diagnosis     Seizure (H)     Factor V deficiency (H)     Past Surgical History:   Procedure Laterality Date     BRAIN SURGERY  05/18/2016      Social History     Tobacco Use     Smoking status: Never Smoker     Smokeless tobacco: Never Used   Substance Use Topics     Alcohol use: Yes      Problem (# of Occurrences) Relation (Name,Age of Onset)    Hyperlipidemia (1) Father            Current Outpatient Medications   Medication Sig     dicloxacillin (DYNAPEN) 500 MG capsule Take 1 capsule (500 mg) by mouth 4 times daily for 7 days     fluconazole (DIFLUCAN)  "150 MG tablet Take 1 tablet (150 mg) by mouth every 3 days     fluticasone (FLOVENT HFA) 110 MCG/ACT inhaler as needed.     SUMAtriptan (IMITREX) 20 MG/ACT nasal spray U 1 SPRAY AT ONSET OF MIGRAINE. MAY REPEAT IN 2 H. MAX 2 PER DAY. MAX 9 DAYS PER MONTH.     VENTOLIN  (90 Base) MCG/ACT inhaler INL 1 TO 2 PFS PO Q 4 H PRN     No current facility-administered medications for this visit.      Allergies   Allergen Reactions     Amoxicillin-Pot Clavulanate      PN: bad yeast infection     Augmentin      Other reaction(s): Other (see comments)  PN: bad yeast infection  PN: bad yeast infection       ROS:  12 point review of systems negative other than symptoms noted below or in the HPI.  Breast: Tenderness  Skin: Rash  Normal menstrual cycles      OBJECTIVE:     BP 94/52   Temp 98.1  F (36.7  C)   Ht 1.715 m (5' 7.5\")   Wt 72 kg (158 lb 12.8 oz)   LMP 09/21/2020 (Approximate)   Breastfeeding No   BMI 24.50 kg/m    Body mass index is 24.5 kg/m .    Exam:  Constitutional:  Appearance: Well nourished, well developed alert, in no acute distress   Right breast no masses, no discharge or discoloration noted.  Axilla negative.  No tenderness noted.  Left breast fairly large hard area outer lower and upper quadrant .  Very tender to patient, no redness or warmth noted.  No discharge, Axilla negative.  Patient states feels like mastitis she had when had babies.  She did take advil this am, doesn't feel like she has had a fever.      In-Clinic Test Results:  No results found for this or any previous visit (from the past 24 hour(s)).    ASSESSMENT/PLAN:                                                        ICD-10-CM    1. Yeast infection  B37.9 fluconazole (DIFLUCAN) 150 MG tablet   2. Breast infection  N61.0 dicloxacillin (DYNAPEN) 500 MG capsule   3. Breast lump  N63.0 US Breast Left Complete 4 Quadrants       There are no Patient Instructions on file for this visit.    Discussed allergy to amoxicillin and patient " states it is not a real allergy, she just gets bad yeast infections when on medication.  Discussed doing diflucan with the dicloxacillin and patient agreed.  Patient also to have a left breast US r/o abscess or abnormal lump.    If not improving with symptoms, patient to return    JULIO C Morillo [facility]

## 2020-09-29 ENCOUNTER — TELEPHONE (OUTPATIENT)
Dept: OBGYN | Facility: CLINIC | Age: 45
End: 2020-09-29

## 2020-09-29 ENCOUNTER — HOSPITAL ENCOUNTER (OUTPATIENT)
Dept: MAMMOGRAPHY | Facility: CLINIC | Age: 45
Discharge: HOME | End: 2020-09-29
Attending: NURSE PRACTITIONER | Admitting: NURSE PRACTITIONER
Payer: COMMERCIAL

## 2020-09-29 DIAGNOSIS — N63.0 BREAST LUMP: ICD-10-CM

## 2020-09-29 DIAGNOSIS — N61.0 BREAST INFECTION: Primary | ICD-10-CM

## 2020-09-29 PROCEDURE — 76642 ULTRASOUND BREAST LIMITED: CPT | Mod: RT

## 2020-09-29 RX ORDER — CEPHALEXIN 500 MG/1
500 CAPSULE ORAL 2 TIMES DAILY
Qty: 14 CAPSULE | Refills: 0 | Status: SHIPPED | OUTPATIENT
Start: 2020-09-29

## 2020-09-29 NOTE — TELEPHONE ENCOUNTER
Rx sent and pt informed.    Pt verbalized understanding, in agreement with plan, and voiced no further questions.  Raquel Sanz RN on 9/29/2020 at 4:41 PM

## 2020-09-29 NOTE — TELEPHONE ENCOUNTER
Called pt back. No answer.    I would recommend keflex but she has an allergy to augmentin. I attempted to clarify with her.     RX pended. If she's okay to take, go ahead a send it.

## 2020-09-29 NOTE — TELEPHONE ENCOUNTER
Rx's dicloxacillin and diflucan at [facility] 9/28/20 w Jessica    Stomach upset and nausea.    Routing to provider to advise.  Raquel Sanz RN on 9/29/2020 at 10:11 AM

## 2020-09-29 NOTE — TELEPHONE ENCOUNTER
Patient returning call, she reports she does not have a a true allergy to augmentin, but gets yeast infections.  She has not been taking other antibiotic due to stomach upset.  Would like new rx sent in today if possible, Raquel SAN Mentioned Keflex as an option.

## 2021-04-24 ENCOUNTER — HEALTH MAINTENANCE LETTER (OUTPATIENT)
Age: 46
End: 2021-04-24

## 2021-04-26 ENCOUNTER — OFFICE VISIT (OUTPATIENT)
Dept: OBGYN | Facility: CLINIC | Age: 46
End: 2021-04-26
Payer: COMMERCIAL

## 2021-04-26 VITALS
HEART RATE: 76 BPM | WEIGHT: 158 LBS | SYSTOLIC BLOOD PRESSURE: 116 MMHG | BODY MASS INDEX: 24.8 KG/M2 | HEIGHT: 67 IN | DIASTOLIC BLOOD PRESSURE: 74 MMHG

## 2021-04-26 DIAGNOSIS — N89.8 VAGINAL SORE: Primary | ICD-10-CM

## 2021-04-26 PROCEDURE — 99213 OFFICE O/P EST LOW 20 MIN: CPT | Performed by: OBSTETRICS & GYNECOLOGY

## 2021-04-26 PROCEDURE — 86695 HERPES SIMPLEX TYPE 1 TEST: CPT | Performed by: OBSTETRICS & GYNECOLOGY

## 2021-04-26 PROCEDURE — 36415 COLL VENOUS BLD VENIPUNCTURE: CPT | Performed by: OBSTETRICS & GYNECOLOGY

## 2021-04-26 PROCEDURE — 87529 HSV DNA AMP PROBE: CPT | Performed by: OBSTETRICS & GYNECOLOGY

## 2021-04-26 PROCEDURE — 86696 HERPES SIMPLEX TYPE 2 TEST: CPT | Performed by: OBSTETRICS & GYNECOLOGY

## 2021-04-26 RX ORDER — ACYCLOVIR 400 MG/1
400 TABLET ORAL EVERY 8 HOURS
Qty: 30 TABLET | Refills: 0 | Status: SHIPPED | OUTPATIENT
Start: 2021-04-26 | End: 2021-05-06

## 2021-04-26 ASSESSMENT — MIFFLIN-ST. JEOR: SCORE: 1389.31

## 2021-04-26 NOTE — PATIENT INSTRUCTIONS
-I recommend HSV antibody testing (blood test) for your .   -Please make a phone visit with me after he has had his testing results and we can put every thing together and talk about results.

## 2021-04-26 NOTE — PROGRESS NOTES
SUBJECTIVE:                                                   Lucia Moore is a 45 year old female who presents to clinic today for the following health issue(s):  Patient presents with:  Vaginal Problem: vaginal sores        HPI:  Previously seeing [place] for exectiutive annuals. Last seen in March there for pap f/u for HPV. Was supposed to go back in couple mo to f/u on pap without enough cells.   Also used to see [facility].     Started having sores Friday 4/23. Itching, burning. No vaginal discharge.   Feels like she is having a HPV outbreak which she has never seen before.   Is now 3 bumps, getting uncomfortable, triggered by going to restroom. Would forget if it werent for that.   No other partners in last 10yrs    Covid vaccine #2 moderna, 4/22, day before symptoms started. Was feeling achy, tired.     Completed review of PMH, SocHx, SurHx, FHx, medications, and care everywhere reviewed. Epic updated.        Patient's last menstrual period was 04/02/2021..     Patient is sexually active, No obstetric history on file..  Using none for contraception.    reports that she has never smoked. She has never used smokeless tobacco.    STD testing offered?  Declined    Health maintenance updated:  yes    Today's PHQ-2 Score:   PHQ-2 ( 1999 Pfizer) 4/26/2021   Q1: Little interest or pleasure in doing things 0   Q2: Feeling down, depressed or hopeless 0   PHQ-2 Score 0     Today's PHQ-9 Score:   PHQ-9 SCORE 6/30/2017   PHQ-9 Total Score 1     Today's RITA-7 Score:   RITA-7 SCORE 6/30/2017   Total Score 2       Problem list and histories reviewed & adjusted, as indicated.  Additional history: as documented.    Patient Active Problem List   Diagnosis     Seizure (H)     Factor V deficiency (H)     Past Surgical History:   Procedure Laterality Date     BRAIN SURGERY  05/18/2016      Social History     Tobacco Use     Smoking status: Never Smoker     Smokeless tobacco: Never Used   Substance Use Topics     Alcohol use:  "Yes      Problem (# of Occurrences) Relation (Name,Age of Onset)    Hyperlipidemia (1) Father            Current Outpatient Medications   Medication Sig     acyclovir (ZOVIRAX) 400 MG tablet Take 1 tablet (400 mg) by mouth every 8 hours for 10 days     cephALEXin (KEFLEX) 500 MG capsule Take 1 capsule (500 mg) by mouth 2 times daily     fluticasone (FLOVENT HFA) 110 MCG/ACT inhaler as needed.     SUMAtriptan (IMITREX) 20 MG/ACT nasal spray U 1 SPRAY AT ONSET OF MIGRAINE. MAY REPEAT IN 2 H. MAX 2 PER DAY. MAX 9 DAYS PER MONTH.     VENTOLIN  (90 Base) MCG/ACT inhaler INL 1 TO 2 PFS PO Q 4 H PRN     fluconazole (DIFLUCAN) 150 MG tablet Take 1 tablet (150 mg) by mouth every 3 days (Patient not taking: Reported on 4/26/2021)     No current facility-administered medications for this visit.      Allergies   Allergen Reactions     Amoxicillin-Pot Clavulanate      PN: bad yeast infection     Augmentin      Other reaction(s): Other (see comments)  PN: bad yeast infection  PN: bad yeast infection       ROS:  12 point review of systems negative other than symptoms noted below or in the HPI.  Genitourinary: Vaginal Itching and sore   No urinary frequency or dysuria, bladder or kidney problems      OBJECTIVE:     /74   Pulse 76   Ht 1.702 m (5' 7\")   Wt 71.7 kg (158 lb)   LMP 04/02/2021   BMI 24.75 kg/m    Body mass index is 24.75 kg/m .    Exam:  Constitutional:  Appearance: Well nourished, well developed alert, in no acute distress  Lymphatic: Lymph Nodes:  No other lymphadenopathy present  Skin: General Inspection:  No rashes present, no lesions present, no areas of discoloration.  Neurologic:  Mental Status:  Oriented X3.  Normal strength and tone, sensory exam grossly normal, mentation intact and speech normal.    Psychiatric:  Mentation appears normal and affect normal/bright.  Pelvic Exam:  External Genitalia:     Normal appearance for age, no discharge present, no tenderness present, no inflammatory " lesions present, color normal  Vagina:     Normal vaginal vault without central or paravaginal defects, no discharge present, no masses present, 3 SMALL ULCERATIONS AT THE INTROITUS LEVEL SLIGHTLY TO THE LEFT LOWER SIDE OF INNER LABIA MINORA  Bladder:     Nontender to palpation  Urethra:   Urethral Body:  Urethra palpation normal, urethra structural support normal   Urethral Meatus:  No erythema or lesions present  Cervix:     Appearance healthy, no lesions present, nontender to palpation, no bleeding present  Perineum:     Perineum within normal limits, no evidence of trauma, no rashes or skin lesions present  Anus:     Anus within normal limits, no hemorrhoids present  Inguinal Lymph Nodes:     No lymphadenopathy present  Pubic Hair:     Normal pubic hair distribution for age  Genitalia and Groin:     No rashes present, no lesions present, no areas of discoloration, no masses present         ASSESSMENT/PLAN:                                                        ICD-10-CM    1. Vaginal sore  N89.8 Herpes Simplex Virus 1&2 PCR Swab     Herpes Simplex Virus 1 and 2 IgG     acyclovir (ZOVIRAX) 400 MG tablet       Patient Instructions   -I recommend HSV antibody testing (blood test) for your .   -Please make a phone visit with me after he has had his testing results and we can put every thing together and talk about results.       -Discussed ulcerations are consistent with HSV lesions.  Discussed HSV is typically clinically diagnosed.  However due to the timing of the lesions close to the covid vaccine, and no history, and that she is in a monogamous relationship will obtain antibody testing as well as PCR of the lesions themselves.   Discussed the potential immune response and response of other dormant infectious viruses already present within her body upon receiving vaccination.   I recommend her  get tested for HSV antibodies.  I then recommend she make a phone visit so that we can put all the test  together and discuss results.  Discussed HSV virus, transmission, outbreaks and timing.  Will prescribe acyclovir for first outbreak, moderate symptoms.  Questions answered    Tyra Caicedo Masters, [facility] [place]

## 2021-04-27 LAB
HSV1 DNA SPEC QL NAA+PROBE: NOT DETECTED
HSV1 IGG SERPL QL IA: <0.2 AI (ref 0–0.8)
HSV2 DNA SPEC QL NAA+PROBE: NOT DETECTED
HSV2 IGG SERPL QL IA: <0.2 AI (ref 0–0.8)
LABORATORY COMMENT REPORT: NORMAL
SPECIMEN SOURCE: NORMAL

## 2021-10-09 ENCOUNTER — HEALTH MAINTENANCE LETTER (OUTPATIENT)
Age: 46
End: 2021-10-09

## 2022-05-21 ENCOUNTER — HEALTH MAINTENANCE LETTER (OUTPATIENT)
Age: 47
End: 2022-05-21

## 2022-09-17 ENCOUNTER — HEALTH MAINTENANCE LETTER (OUTPATIENT)
Age: 47
End: 2022-09-17

## 2023-06-03 ENCOUNTER — HEALTH MAINTENANCE LETTER (OUTPATIENT)
Age: 48
End: 2023-06-03

## 2024-10-10 ENCOUNTER — APPOINTMENT (OUTPATIENT)
Dept: URBAN - METROPOLITAN AREA CLINIC 206 | Age: 49
Setting detail: DERMATOLOGY
End: 2024-10-21

## 2024-10-10 DIAGNOSIS — Z41.9 ENCOUNTER FOR PROCEDURE FOR PURPOSES OTHER THAN REMEDYING HEALTH STATE, UNSPECIFIED: ICD-10-CM

## 2024-10-10 PROCEDURE — OTHER MIPS QUALITY: OTHER

## 2024-10-10 PROCEDURE — OTHER INVENTORY: OTHER

## 2024-10-10 PROCEDURE — OTHER BOTOX: OTHER

## 2024-10-10 NOTE — PROCEDURE: BOTOX
Price (Use Numbers Only, No Special Characters Or $): 658
Detail Level: Detailed
Show Inventory Tab: Hide
Post-Care Instructions: Patient instructed to not lie down for 4 hours and limit physical activity for 24 hours.
Map Statement: Please see attached map for locations and injection amounts.
Consent: Written consent obtained. Risks include but not limited to lid/brow ptosis, bruising, swelling, diplopia, temporary effect, incomplete chemical denervation.
Total Units: 0

## 2025-02-20 ENCOUNTER — APPOINTMENT (OUTPATIENT)
Dept: URBAN - METROPOLITAN AREA CLINIC 206 | Age: 50
Setting detail: DERMATOLOGY
End: 2025-02-26

## 2025-02-20 DIAGNOSIS — Z41.9 ENCOUNTER FOR PROCEDURE FOR PURPOSES OTHER THAN REMEDYING HEALTH STATE, UNSPECIFIED: ICD-10-CM

## 2025-02-20 PROCEDURE — OTHER MIPS QUALITY: OTHER

## 2025-02-20 PROCEDURE — OTHER BOTOX: OTHER

## 2025-02-20 PROCEDURE — OTHER INVENTORY: OTHER

## 2025-02-20 NOTE — PROCEDURE: BOTOX
Price (Use Numbers Only, No Special Characters Or $): 539
Detail Level: Detailed
Show Inventory Tab: Hide
Post-Care Instructions: Patient instructed to not lie down for 4 hours and limit physical activity for 24 hours.
Map Statement: Please see attached map for locations and injection amounts.
Consent: Written consent obtained. Risks include but not limited to lid/brow ptosis, bruising, swelling, diplopia, temporary effect, incomplete chemical denervation.
Total Units: 0
No

## 2025-05-27 ENCOUNTER — APPOINTMENT (OUTPATIENT)
Dept: URBAN - METROPOLITAN AREA CLINIC 206 | Age: 50
Setting detail: DERMATOLOGY
End: 2025-05-28

## 2025-05-27 DIAGNOSIS — Z41.9 ENCOUNTER FOR PROCEDURE FOR PURPOSES OTHER THAN REMEDYING HEALTH STATE, UNSPECIFIED: ICD-10-CM

## 2025-05-27 PROCEDURE — OTHER INVENTORY: OTHER

## 2025-05-27 PROCEDURE — OTHER BOTOX: OTHER

## 2025-05-27 PROCEDURE — OTHER MIPS QUALITY: OTHER

## 2025-05-27 ASSESSMENT — LOCATION SIMPLE DESCRIPTION DERM
LOCATION SIMPLE: LEFT CHEEK
LOCATION SIMPLE: RIGHT TEMPLE
LOCATION SIMPLE: LEFT FOREHEAD
LOCATION SIMPLE: RIGHT FOREHEAD
LOCATION SIMPLE: GLABELLA
LOCATION SIMPLE: RIGHT CHEEK
LOCATION SIMPLE: LEFT EYELID

## 2025-05-27 ASSESSMENT — LOCATION DETAILED DESCRIPTION DERM
LOCATION DETAILED: GLABELLA
LOCATION DETAILED: RIGHT FOREHEAD
LOCATION DETAILED: LEFT SUPERIOR CENTRAL MALAR CHEEK
LOCATION DETAILED: LEFT SUPERIOR FOREHEAD
LOCATION DETAILED: RIGHT INFERIOR TEMPLE
LOCATION DETAILED: LEFT FOREHEAD
LOCATION DETAILED: RIGHT SUPERIOR FOREHEAD
LOCATION DETAILED: LEFT LATERAL CANTHUS
LOCATION DETAILED: RIGHT SUPERIOR CENTRAL MALAR CHEEK
LOCATION DETAILED: RIGHT INFERIOR MEDIAL FOREHEAD
LOCATION DETAILED: LEFT INFERIOR MEDIAL FOREHEAD

## 2025-05-27 ASSESSMENT — LOCATION ZONE DERM
LOCATION ZONE: EYELID
LOCATION ZONE: FACE

## 2025-06-09 ENCOUNTER — APPOINTMENT (OUTPATIENT)
Dept: URBAN - METROPOLITAN AREA CLINIC 206 | Age: 50
Setting detail: DERMATOLOGY
End: 2025-06-15

## 2025-06-09 DIAGNOSIS — Z41.9 ENCOUNTER FOR PROCEDURE FOR PURPOSES OTHER THAN REMEDYING HEALTH STATE, UNSPECIFIED: ICD-10-CM

## 2025-06-09 PROCEDURE — OTHER INVENTORY: OTHER

## 2025-06-09 PROCEDURE — OTHER MIPS QUALITY: OTHER

## 2025-06-09 PROCEDURE — OTHER OTHER: OTHER

## 2025-06-09 PROCEDURE — OTHER BOTOX: OTHER

## 2025-08-26 ENCOUNTER — APPOINTMENT (OUTPATIENT)
Dept: URBAN - METROPOLITAN AREA CLINIC 206 | Age: 50
Setting detail: DERMATOLOGY
End: 2025-09-03

## 2025-08-26 DIAGNOSIS — Z41.9 ENCOUNTER FOR PROCEDURE FOR PURPOSES OTHER THAN REMEDYING HEALTH STATE, UNSPECIFIED: ICD-10-CM

## 2025-08-26 DIAGNOSIS — L72.0 EPIDERMAL CYST: ICD-10-CM

## 2025-08-26 PROCEDURE — OTHER INVENTORY: OTHER

## 2025-08-26 PROCEDURE — 99212 OFFICE O/P EST SF 10 MIN: CPT

## 2025-08-26 PROCEDURE — OTHER BOTOX: OTHER

## 2025-08-26 PROCEDURE — OTHER COUNSELING: OTHER

## 2025-08-26 PROCEDURE — OTHER MIPS QUALITY: OTHER

## 2025-08-26 ASSESSMENT — LOCATION ZONE DERM
LOCATION ZONE: EYELID
LOCATION ZONE: FACE

## 2025-08-26 ASSESSMENT — LOCATION SIMPLE DESCRIPTION DERM
LOCATION SIMPLE: RIGHT TEMPLE
LOCATION SIMPLE: LEFT FOREHEAD
LOCATION SIMPLE: RIGHT FOREHEAD
LOCATION SIMPLE: LEFT CHEEK
LOCATION SIMPLE: LEFT EYELID
LOCATION SIMPLE: RIGHT CHEEK
LOCATION SIMPLE: GLABELLA

## 2025-08-26 ASSESSMENT — LOCATION DETAILED DESCRIPTION DERM
LOCATION DETAILED: LEFT SUPERIOR CENTRAL MALAR CHEEK
LOCATION DETAILED: GLABELLA
LOCATION DETAILED: RIGHT CENTRAL MALAR CHEEK
LOCATION DETAILED: RIGHT SUPERIOR CENTRAL MALAR CHEEK
LOCATION DETAILED: LEFT FOREHEAD
LOCATION DETAILED: RIGHT FOREHEAD
LOCATION DETAILED: LEFT LATERAL CANTHUS
LOCATION DETAILED: RIGHT INFERIOR TEMPLE
LOCATION DETAILED: LEFT INFERIOR MEDIAL FOREHEAD
LOCATION DETAILED: RIGHT SUPERIOR FOREHEAD
LOCATION DETAILED: RIGHT INFERIOR MEDIAL FOREHEAD
LOCATION DETAILED: LEFT SUPERIOR FOREHEAD